# Patient Record
Sex: MALE | Race: WHITE
[De-identification: names, ages, dates, MRNs, and addresses within clinical notes are randomized per-mention and may not be internally consistent; named-entity substitution may affect disease eponyms.]

---

## 2018-03-21 ENCOUNTER — HOSPITAL ENCOUNTER (EMERGENCY)
Dept: HOSPITAL 80 - FED | Age: 58
Discharge: HOME | End: 2018-03-21
Payer: COMMERCIAL

## 2018-03-21 VITALS
RESPIRATION RATE: 18 BRPM | SYSTOLIC BLOOD PRESSURE: 122 MMHG | DIASTOLIC BLOOD PRESSURE: 96 MMHG | TEMPERATURE: 97.7 F | OXYGEN SATURATION: 96 % | HEART RATE: 98 BPM

## 2018-03-21 DIAGNOSIS — Z79.4: ICD-10-CM

## 2018-03-21 DIAGNOSIS — E11.9: ICD-10-CM

## 2018-03-21 DIAGNOSIS — G89.4: Primary | ICD-10-CM

## 2018-03-21 NOTE — EDPHY
H & P


Stated Complaint: Chronic pain pt who wants to kill himself because no one is 

tx his pain


Time Seen by Provider: 03/21/18 12:38





- Personal History


Current Tetanus Diphtheria and Acellular Pertussis (TDAP): Yes





- Medical/Surgical History


Hx Diabetes: Yes


Other PMH: diab.  peripheral neuropathy.  chronic pain.  psych





- Social History


Smoking Status: Never smoked


Constitutional: 


 Initial Vital Signs











Temperature (C)  36.5 C   03/21/18 12:10


 


Heart Rate  98   03/21/18 12:10


 


Respiratory Rate  18   03/21/18 12:10


 


Blood Pressure  122/96 H  03/21/18 12:10


 


O2 Sat (%)  96   03/21/18 12:10








 











O2 Delivery Mode               Room Air














Allergies/Adverse Reactions: 


 





No Known Allergies Allergy (Unverified 03/21/18 12:18)


 








Home Medications: 














 Medication  Instructions  Recorded


 


Divalproex [Depakote] 250 mg PO 03/21/18


 


Gabapentin [Neurontin 300 MG (*)] 300 mg PO TID #90 cap 03/21/18


 


Gabapentin [Neurontin 400 MG (*)] 400 mg PO HS 03/21/18


 


Hydrochlorothiazide [HCTZ (*)] 25 mg PO DAILY 03/21/18


 


Insulin Lispro [Humalog] 100 unit SQ 03/21/18


 


Levothyroxine [Synthroid 100 mcg 100 mcg PO DAILY06 03/21/18





(*)]  


 


QUEtiapine FUMARATE [Seroquel 100 100 mg PO 03/21/18





mg (*)]  


 


clonazePAM [klonoPIN (*)] 1 mg PO 03/21/18


 


fentaNYL [Duragesic 50 MCG Patch 50 mcg TD Q72H #2 patch 03/21/18





(*)]  


 


metFORMIN HCL [Glucophage 500 mg 500 mg PO 03/21/18





(*)]  


 


oxyCODONE IR [Oxycodone Ir (*)] 5 - 10 mg PO Q6 PRN #30 tab 03/21/18














Medical Decision Making


ED Course/Re-evaluation: 





CHIEF COMPLAINT:  Psychiatric evaluation





HISTORY OF PRESENT ILLNESS:  


The patient is a 56 y/o male with a history of diabetes and chronic pain who 

stated he wanted to kill himself as no one was taking his pain seriously at the 

orthopedics office. He states his doctors did not do what they were supposed to 

do for his pain management and no medications have worked for his pain. The 

pain is currently everywhere. States he is not suicidal today but thinks he 

will be in a few weeks if his pain is not controlled better. When his pain is 

controlled he is not suicidal. He has an appointment with the pain management 

clinic next week. Denies taking Neurontin in the past. Denies chest pain, 

abdominal pain, urinary or bowel complaints, fever. 





REVIEW OF SYSTEMS:  





A 10 point review of systems was performed and is negative with the exception 

of the elements mentioned in the history of present illness.





PHYSICAL EXAM:  





General Appearance:  Alert, well hydrated, appropriate, and non-toxic appearing.


Head:  Atraumatic without scalp tenderness or obvious injury


Eyes:  Pupils equal, round, reactive to light and accommodation, EOMI, no trauma

, no injection.


Ears:  Clear bilaterally, no perforation, normal landmarks


Nose:  Atraumatic, no rhinorrhea, clear.


Throat:  There is no erythema or exudates, no lesions, normal tonsils, mucus 

membranes moist.


Neck:  Supple, nontender, no lymphadenopathy.


Respiratory:  No retractions, no distress, no wheezes, and no accessory muscle 

use.  Lungs are clear to auscultation bilaterally.


Cardiovascular:  Regular rate and rhythm, no murmurs, rubs, or gallops. 

Bilateral carotid, radial, dorsalis pedis, and posterior tibial pulses intact. 

Good capillary refill all extremities.


Gastrointestinal:  Abdomen is soft, nontender, non-distended, no masses, no 

rebound, no guarding, no peritoneal signs.


Musculoskeletal:  Normal active ROM of all extremities, atraumatic.


Neurological:  Alert, appropriate, and interactive. Non-focal neuro.


Skin:  No rashes, good turgor, no nodules on palpation.





Past medical history: Diabetes,  peripheral neuropathy, chronic pain, psych


Past surgical history: Denies


Family history: Denies


Social history: Wife at bedside, lives in [facility]





DIFFERENTIAL DIAGNOSIS:   The differential diagnosis for the patient's 

depression included but was not limited to functional and major depression, 

situational depression, medication side effect, drugs, and alcohol abuse.





MEDICAL DECISION MAKING:  


The patient is a 56 y/o male with a history of diabetes and chronic pain who 

stated he wanted to kill himself as no one was taking his pain seriously at the 

orthopedics office. Patient is in no acute distress and is hemodynamically 

stable.  He will not need a psychiatric evaluation as he is not suicidal as 

long as his pain is controlled. Patient has known history of psychiatric 

disorders and is here for evaluation.





Reassessed patient and discussed prescriptions for Fentanyl patches, OxyIR, and 

Neurontin for his pain. He is not suicidal as long as his pain is controlled. 

He states if I prescribe him the pain medications for the next week, he will 

not kill himself. I have advised him to keep his visit with the pain management 

clinic next week. Return precautions provided; patient is comfortable with this 

plan. 





1400: Consulted with Dr. Major, the patient's psychiatrist, regarding the 

patient's symptoms.








Departure





- Departure


Disposition: Home, Routine, Self-Care


Clinical Impression: 


Chronic pain


Qualifiers:


 Chronic pain type: chronic pain syndrome Qualified Code(s): G89.4 - Chronic 

pain syndrome





Condition: Good


Instructions:  Chronic Pain (ED)


Additional Instructions: 


1. Use the Fentanyl patch as prescribed. Change it every 72 hours.


2. Take OxyIR as prescribed. 


3. Take Neurontin as prescribed.


4. Follow-up with your primary doctor within 72 hours. 


5. Return to the Emergency Department for fever, chest pain, shortness of breath

, increasing pain or other worsening of condition.


Referrals: 


Bowen Tyler MD [Primary Care Provider] - As per Instructions


Prescriptions: 


fentaNYL [Duragesic 50 MCG Patch (*)] 50 mcg TD Q72H #2 patch


Gabapentin [Neurontin 300 MG (*)] 300 mg PO TID #90 cap


oxyCODONE IR [Oxycodone Ir (*)] 5 - 10 mg PO Q6 PRN #30 tab


 PRN Reason: Pain, Severe


Report Scribed for: Gerardo Marroquin


Report Scribed by: Vandana Esqueda


Date of Report: 03/21/18


Time of Report: 12:42

## 2018-03-21 NOTE — EDPHY
H & P


Stated Complaint: Chronic pain pt who wants to kill himself because no one is 

tx his pain


Time Seen by Provider: 03/21/18 12:38





- Personal History


Current Tetanus Diphtheria and Acellular Pertussis (TDAP): Yes





- Medical/Surgical History


Hx Diabetes: Yes


Other PMH: diab.  peripheral neuropathy.  chronic pain.  psych





- Social History


Smoking Status: Never smoked


Constitutional: 





 Initial Vital Signs











Temperature (C)  36.5 C   03/21/18 12:10


 


Heart Rate  98   03/21/18 12:10


 


Respiratory Rate  18   03/21/18 12:10


 


Blood Pressure  122/96 H  03/21/18 12:10


 


O2 Sat (%)  96   03/21/18 12:10








 











O2 Delivery Mode               Room Air














Allergies/Adverse Reactions: 


 





No Known Allergies Allergy (Unverified 03/21/18 12:18)


 








Home Medications: 














 Medication  Instructions  Recorded


 


Divalproex [Depakote] 250 mg PO 03/21/18


 


Gabapentin [Neurontin 400 MG (*)] 400 mg PO HS 03/21/18


 


Hydrochlorothiazide [HCTZ (*)] 25 mg PO DAILY 03/21/18


 


Insulin Lispro [Humalog] 100 unit SQ 03/21/18


 


Levothyroxine [Synthroid 100 mcg 100 mcg PO DAILY06 03/21/18





(*)]  


 


QUEtiapine FUMARATE [Seroquel 100 100 mg PO 03/21/18





mg (*)]  


 


clonazePAM [klonoPIN (*)] 1 mg PO 03/21/18


 


metFORMIN HCL [Glucophage 500 mg 500 mg PO 03/21/18





(*)]  














Medical Decision Making


ED Course/Re-evaluation: 





CHIEF COMPLAINT:  





HISTORY OF PRESENT ILLNESS:  must have 4 elements:  Location, Quality, Severity

, Duration, Timing, Context, Modifying Factors, Associated Signs and Symptoms





REVIEW OF SYSTEMS:  





A 10 point review of systems was performed and is negative with the exception 

of the elements mentioned in the history of present illness.





PHYSICAL EXAM:  





HR, BP, O2 Sat, RR.  Temp noted


General Appearance:  Alert, well hydrated, appropriate, and non-toxic appearing.


Head:  Atraumatic without scalp tenderness or obvious injury


Eyes:  Pupils equal, round, reactive to light and accommodation, EOMI, no trauma

, no injection.


Ears:  Clear bilaterally, no perforation, normal landmarks


Nose:  Atraumatic, no rhinorrhea, clear.


Throat:  There is no erythema or exudates, no lesions, normal tonsils, mucus 

membranes moist.


Neck:  Supple, 2+ carotid upstroke, nontender, no lymphadenopathy.


Respiratory:  No retractions, no distress, no wheezes, and no accessory muscle 

use.  Lungs are clear to auscultation bilaterally.


Cardiovascular:  Regular rate and rhythm, no murmurs, rubs, or gallops. 

Bilateral carotid, radial, dorsalis pedis, and posterior tibial pulses intact. 

Good capillary refill all extremities.


Gastrointestinal:  Abdomen is soft, nontender, non-distended, no masses, no 

rebound, no guarding, no peritoneal signs.


Musculoskeletal:  Normal active ROM of all extremities, atraumatic.


Neurological:  Alert, appropriate, and interactive.  The patient has normal 

DTRs and non-focal cranial nerves, motor, sensory, and cerebellar exam.


Skin:  No rashes, good turgor, no nodules on palpation.





Past medical history:


Past surgical history:


Family history:


Social history:





DIAGNOSTICS/PROCEDURES/CRITICAL CARE TIME:  








DIFFERENTIAL DIAGNOSIS:   





MEDICAL DECISION MAKING:  





Departure





- Departure


Referrals: 


Bowen Tyler MD [Primary Care Provider] - As per Instructions